# Patient Record
(demographics unavailable — no encounter records)

---

## 2025-02-06 NOTE — PHYSICAL EXAM
Face to Face Evaluation for Restraint/Seculsion    Behavior Observed: PT CALM, VOICED THE REASON SHE COULD NOT LEAVE THE INTAKE ROOM & APOLOGIZED TO STAFF.  NO HARM TO PT OR STAFF.  Recommendations: BRIEF HOLD DISCONTINUED.    Rochelle Buchanan RN  06/06/18  8:18 AM     [Midline] : trachea located in midline position [Normal] : no rashes [de-identified] : Incision well healed. NIMA

## 2025-02-06 NOTE — REASON FOR VISIT
[Subsequent Evaluation] : a subsequent evaluation for [Other: _____] : [unfilled] [FreeTextEntry2] : thyroid nodule

## 2025-02-06 NOTE — HISTORY OF PRESENT ILLNESS
[de-identified] : This is a 37 yro female patient who was referred by Dr. Pearce  for thyroid nodule. FNA from 4/8/2022 showed [place] III -AUS with suspicious Affirma ~50% risk of malignancy for left lobectomy. S/p  Left thyroid lobectomy and isthmusectomy on 6/17/2022. Path report showed NIFTP.  Pt follows up with PCP Dr. Hal Juarez, not seeing an endocrinologist.  labs 01/08/2025: TSH=3.19 wnl, and Free T4= 1.0 wnl (pt shared via her phone).  US thyroid and neck 2/12/2024: stable hyperechoic R lower pole nodule (0.7 x 0.3 x 0.7cm) and similar hyperechoic nodule in the L thyroid bed (0.7 x 0.6 x 0.5cm).  New US done 1/15/25 ([facility]) showed enlarged lymph nodes.  Today denies noticing any neck lumps. No neck pain, dysphagia, dyspnea, or dysphonia. No fever, chills, weight loss. Eating and drinking without issues.  Not on any thyroid medications.   US thyroid neck 1/15/25 ([facility]): FINDINGS:  The thyroid isthmus thickness is 0.2 cm.  The right lobe measures 5.1 x 1.2 x 2.1 cm in sagittal x AP x transverse dimensions.   The left lobe is surgically absent.  Overall thyroid echotexture and blood flow: There is a normal thyroid echotexture and blood flow.  Thyroid nodule assessment:  The left thyroid bed demonstrates an echogenic nodule (nodule 2).  Nodule 1 Location: Right Lower Pole Nodule Dimensions: 0.5 x 0.6 x 0.4 cm as measured in SAG x TRV x AP. This previously measured 0.7 x 0.3 x 0.7 cm on 2/12/2024. TI-RADS Descriptors: *   Composition: Solid or almost completely solid. *   Echogenicity: Hyperechoic or isoechoic. *   Shape: Wider-than-tall. *   Margin: Ill-defined. *   Echogenic Foci: None. AI-Adjusted TI-RADS Score: 3 Recommendation from AI/TI-RADS: No FNA  Nodule 2 Location: Left Mid Nodule Dimensions: 0.7 x 0.5 x 0.5 cm as measured in SAG x TRV x AP. This previously measured 0.7 x 0.6 x 0.5 cm on 2/12/2024 and 0.8 x 0.6 x 0.3 cm on 1/16/2023. It is unclear if this represents a true hypoechoic nodule versus residual thyroid tissue. TI-RADS Descriptors: *   Composition: Solid or almost completely solid. *   Echogenicity: Hyperechoic or isoechoic. *   Shape: Wider-than-tall. *   Margin: Ill-defined. *   Echogenic Foci: None. AI-Adjusted TI-RADS Score: 3 Recommendation from AI/TI-RADS: No FNA  A right level 1 lymph node measures 1.8 x 0.9 x 1.5 cm. A central fatty hilus is identified. This previously measured 1.9 x 0.9 x 1.7 cm on 2/12/2024. A right level 2 lymph node measures 2.2 x 0.8 x 2.1 cm. A central fatty hilus is noted. A right level 2 lymph node measures 1.3 x 0.5 x 1.6 cm.  A left level 1 lymph node measures 1.8 x 1.2 x 1.2 cm. A central fatty hilus is noted. This previously measured 1.9 x 0.8 x 1.7 cm on 2/12/2024. An adjacent level 1 lymph node measures 1.2 x 0.6 x 1.0 cm. A left level 2 lymph node measures 0.9 x 0.9 x 1.1 cm. A central fatty hilus is noted. A left level 3 lymph node measures 2.4 x 0.8 x 1.0 cm. A central fatty hilus is noted.  IMPRESSION:  1. Stable left thyroid bed echogenic nodule which may represent a possible lymph node versus a nodule versus residual thyroid tissue. 2. Enlarged left level 1 lymph node measuring 1.8 x 1.2 x 1.2 cm. A central fatty hilus is identified. A borderline enlarged left level 3 lymph node is identified measuring 2.4 x 0.8 x 1.0 cm. A central fatty hilus is noted. Clinical correlation as to further patient management is recommended for these lymph nodes. 3. Yearly ultrasound follow-up is recommended of the thyroid gland

## 2025-02-06 NOTE — CONSULT LETTER
[Dear  ___] : Dear  [unfilled], [Courtesy Letter:] : I had the pleasure of seeing your patient, [unfilled], in my office today. [Please see my note below.] : Please see my note below. [Consult Closing:] : Thank you very much for allowing me to participate in the care of this patient.  If you have any questions, please do not hesitate to contact me. [Sincerely,] : Sincerely, [FreeTextEntry2] : Dr Hal Juarez [FreeTextEntry3] : \par  Khalif Lara MD, FACS\par  \par  Otolaryngology-Head and Neck Surgery\par  [employer] [facility]\par